# Patient Record
Sex: FEMALE | Race: WHITE | URBAN - METROPOLITAN AREA
[De-identification: names, ages, dates, MRNs, and addresses within clinical notes are randomized per-mention and may not be internally consistent; named-entity substitution may affect disease eponyms.]

---

## 2022-02-04 ENCOUNTER — WALK IN (OUTPATIENT)
Dept: URGENT CARE | Age: 52
End: 2022-02-04

## 2022-02-04 VITALS
OXYGEN SATURATION: 100 % | SYSTOLIC BLOOD PRESSURE: 130 MMHG | RESPIRATION RATE: 18 BRPM | DIASTOLIC BLOOD PRESSURE: 80 MMHG | TEMPERATURE: 98 F | HEART RATE: 90 BPM | WEIGHT: 140 LBS

## 2022-02-04 DIAGNOSIS — S93.402A SPRAIN OF LEFT ANKLE, UNSPECIFIED LIGAMENT, INITIAL ENCOUNTER: Primary | ICD-10-CM

## 2022-02-04 PROCEDURE — 99212 OFFICE O/P EST SF 10 MIN: CPT | Performed by: EMERGENCY MEDICINE

## 2022-02-04 RX ORDER — HYDROCHLOROTHIAZIDE 12.5 MG/1
TABLET ORAL
COMMUNITY

## 2022-02-04 RX ORDER — METHYLPHENIDATE HYDROCHLORIDE EXTENDED RELEASE 20 MG/1
TABLET ORAL
COMMUNITY

## 2022-02-04 RX ORDER — METOPROLOL SUCCINATE 50 MG/1
50 TABLET, EXTENDED RELEASE ORAL DAILY
COMMUNITY
Start: 2022-01-17

## 2022-02-04 RX ORDER — FLUTICASONE PROPIONATE 50 MCG
SPRAY, SUSPENSION (ML) NASAL
COMMUNITY

## 2022-02-04 ASSESSMENT — PAIN SCALES - GENERAL: PAINLEVEL: 2

## 2025-02-11 ENCOUNTER — HOSPITAL ENCOUNTER (EMERGENCY)
Age: 55
Discharge: HOME | End: 2025-02-11
Attending: EMERGENCY MEDICINE

## 2025-02-11 ENCOUNTER — APPOINTMENT (OUTPATIENT)
Dept: GENERAL RADIOLOGY | Age: 55
End: 2025-02-11
Attending: EMERGENCY MEDICINE

## 2025-02-11 VITALS
OXYGEN SATURATION: 95 % | SYSTOLIC BLOOD PRESSURE: 135 MMHG | TEMPERATURE: 99 F | RESPIRATION RATE: 18 BRPM | HEIGHT: 62 IN | HEART RATE: 95 BPM | DIASTOLIC BLOOD PRESSURE: 85 MMHG | BODY MASS INDEX: 25.61 KG/M2

## 2025-02-11 DIAGNOSIS — J20.8 VIRAL BRONCHITIS: ICD-10-CM

## 2025-02-11 DIAGNOSIS — J06.9 VIRAL UPPER RESPIRATORY TRACT INFECTION WITH COUGH: Primary | ICD-10-CM

## 2025-02-11 LAB
ANION GAP SERPL CALC-SCNC: 12 MMOL/L (ref 7–19)
ATRIAL RATE (BPM): 89
BASOPHILS # BLD: 0 K/MCL (ref 0–0.3)
BASOPHILS NFR BLD: 0 %
BUN SERPL-MCNC: 8 MG/DL (ref 6–20)
BUN/CREAT SERPL: 14 (ref 7–25)
CALCIUM SERPL-MCNC: 9.9 MG/DL (ref 8.4–10.2)
CHLORIDE SERPL-SCNC: 102 MMOL/L (ref 97–110)
CO2 SERPL-SCNC: 27 MMOL/L (ref 21–32)
CREAT SERPL-MCNC: 0.58 MG/DL (ref 0.51–0.95)
DEPRECATED RDW RBC: 42.8 FL (ref 39–50)
EGFRCR SERPLBLD CKD-EPI 2021: >90 ML/MIN/{1.73_M2}
EOSINOPHIL # BLD: 0.8 K/MCL (ref 0–0.5)
EOSINOPHIL NFR BLD: 7 %
ERYTHROCYTE [DISTWIDTH] IN BLOOD: 12.4 % (ref 11–15)
FASTING DURATION TIME PATIENT: ABNORMAL H
FLUAV RNA RESP QL NAA+PROBE: NOT DETECTED
FLUBV RNA RESP QL NAA+PROBE: NOT DETECTED
GLUCOSE SERPL-MCNC: 112 MG/DL (ref 70–99)
HCT VFR BLD CALC: 43.4 % (ref 36–46.5)
HGB BLD-MCNC: 14.6 G/DL (ref 12–15.5)
IMM GRANULOCYTES # BLD AUTO: 0.1 K/MCL (ref 0–0.2)
IMM GRANULOCYTES # BLD: 1 %
LYMPHOCYTES # BLD: 1.7 K/MCL (ref 1–4)
LYMPHOCYTES NFR BLD: 15 %
MAGNESIUM SERPL-MCNC: 1.9 MG/DL (ref 1.7–2.4)
MCH RBC QN AUTO: 31.4 PG (ref 26–34)
MCHC RBC AUTO-ENTMCNC: 33.6 G/DL (ref 32–36.5)
MCV RBC AUTO: 93.3 FL (ref 78–100)
MONOCYTES # BLD: 0.6 K/MCL (ref 0.3–0.9)
MONOCYTES NFR BLD: 5 %
NEUTROPHILS # BLD: 8.4 K/MCL (ref 1.8–7.7)
NEUTROPHILS NFR BLD: 72 %
NRBC BLD MANUAL-RTO: 0 /100 WBC
P AXIS (DEGREES): 55
PLATELET # BLD AUTO: 310 K/MCL (ref 140–450)
POTASSIUM SERPL-SCNC: 3.5 MMOL/L (ref 3.4–5.1)
PR-INTERVAL (MSEC): 144
QRS-INTERVAL (MSEC): 84
QT-INTERVAL (MSEC): 354
QTC: 431
R AXIS (DEGREES): 11
RBC # BLD: 4.65 MIL/MCL (ref 4–5.2)
REPORT TEXT: NORMAL
RSV AG NPH QL IA.RAPID: NOT DETECTED
SARS-COV-2 RNA RESP QL NAA+PROBE: NOT DETECTED
SERVICE CMNT-IMP: NORMAL
SERVICE CMNT-IMP: NORMAL
SODIUM SERPL-SCNC: 137 MMOL/L (ref 135–145)
T AXIS (DEGREES): 47
VENTRICULAR RATE EKG/MIN (BPM): 89
WBC # BLD: 11.6 K/MCL (ref 4.2–11)

## 2025-02-11 PROCEDURE — 10002803 HB RX 637: Performed by: EMERGENCY MEDICINE

## 2025-02-11 PROCEDURE — 85025 COMPLETE CBC W/AUTO DIFF WBC: CPT | Performed by: EMERGENCY MEDICINE

## 2025-02-11 PROCEDURE — 94640 AIRWAY INHALATION TREATMENT: CPT

## 2025-02-11 PROCEDURE — 80048 BASIC METABOLIC PNL TOTAL CA: CPT | Performed by: EMERGENCY MEDICINE

## 2025-02-11 PROCEDURE — 83735 ASSAY OF MAGNESIUM: CPT | Performed by: EMERGENCY MEDICINE

## 2025-02-11 PROCEDURE — 10002807 HB RX 258: Performed by: EMERGENCY MEDICINE

## 2025-02-11 PROCEDURE — 10004180 HB COUNTER-TRANSPORT

## 2025-02-11 PROCEDURE — 93005 ELECTROCARDIOGRAM TRACING: CPT | Performed by: STUDENT IN AN ORGANIZED HEALTH CARE EDUCATION/TRAINING PROGRAM

## 2025-02-11 PROCEDURE — 99285 EMERGENCY DEPT VISIT HI MDM: CPT

## 2025-02-11 PROCEDURE — 94664 DEMO&/EVAL PT USE INHALER: CPT

## 2025-02-11 PROCEDURE — 36415 COLL VENOUS BLD VENIPUNCTURE: CPT

## 2025-02-11 PROCEDURE — 71046 X-RAY EXAM CHEST 2 VIEWS: CPT

## 2025-02-11 PROCEDURE — 0241U COVID/FLU/RSV PANEL: CPT | Performed by: EMERGENCY MEDICINE

## 2025-02-11 RX ORDER — HYDROCODONE BITARTRATE AND ACETAMINOPHEN 5; 325 MG/1; MG/1
1 TABLET ORAL ONCE
Status: DISCONTINUED | OUTPATIENT
Start: 2025-02-11 | End: 2025-02-11 | Stop reason: HOSPADM

## 2025-02-11 RX ORDER — IBUPROFEN 600 MG/1
600 TABLET, FILM COATED ORAL EVERY 8 HOURS PRN
Qty: 21 TABLET | Refills: 0 | Status: SHIPPED | OUTPATIENT
Start: 2025-02-11 | End: 2025-02-18

## 2025-02-11 RX ORDER — ALBUTEROL SULFATE 90 UG/1
2 INHALANT RESPIRATORY (INHALATION) EVERY 4 HOURS PRN
Qty: 8.5 G | Refills: 0 | Status: SHIPPED | OUTPATIENT
Start: 2025-02-11

## 2025-02-11 RX ORDER — IPRATROPIUM BROMIDE AND ALBUTEROL SULFATE 2.5; .5 MG/3ML; MG/3ML
3 SOLUTION RESPIRATORY (INHALATION) ONCE
Status: DISCONTINUED | OUTPATIENT
Start: 2025-02-11 | End: 2025-02-11

## 2025-02-11 RX ORDER — ALBUTEROL SULFATE 90 UG/1
2 INHALANT RESPIRATORY (INHALATION) ONCE
Status: COMPLETED | OUTPATIENT
Start: 2025-02-11 | End: 2025-02-11

## 2025-02-11 RX ORDER — ACETAMINOPHEN 325 MG/1
650 TABLET ORAL ONCE
Status: DISCONTINUED | OUTPATIENT
Start: 2025-02-11 | End: 2025-02-11 | Stop reason: HOSPADM

## 2025-02-11 RX ADMIN — ALBUTEROL SULFATE 2 PUFF: 90 AEROSOL, METERED RESPIRATORY (INHALATION) at 13:16

## 2025-02-11 RX ADMIN — SODIUM CHLORIDE, POTASSIUM CHLORIDE, SODIUM LACTATE AND CALCIUM CHLORIDE 1000 ML: 600; 310; 30; 20 INJECTION, SOLUTION INTRAVENOUS at 13:06

## 2025-02-11 ASSESSMENT — PAIN SCALES - GENERAL: PAINLEVEL_OUTOF10: 4

## 2025-06-11 ENCOUNTER — OFFICE VISIT (OUTPATIENT)
Dept: RHEUMATOLOGY | Facility: CLINIC | Age: 55
End: 2025-06-11
Payer: COMMERCIAL

## 2025-06-11 VITALS
RESPIRATION RATE: 16 BRPM | WEIGHT: 173 LBS | BODY MASS INDEX: 30.65 KG/M2 | HEIGHT: 63 IN | OXYGEN SATURATION: 96 % | SYSTOLIC BLOOD PRESSURE: 128 MMHG | HEART RATE: 95 BPM | TEMPERATURE: 97 F | DIASTOLIC BLOOD PRESSURE: 84 MMHG

## 2025-06-11 DIAGNOSIS — I73.00 RAYNAUD'S DISEASE WITHOUT GANGRENE: ICD-10-CM

## 2025-06-11 DIAGNOSIS — R76.8 POSITIVE ANA (ANTINUCLEAR ANTIBODY): Primary | ICD-10-CM

## 2025-06-11 DIAGNOSIS — L65.9 ALOPECIA: ICD-10-CM

## 2025-06-11 DIAGNOSIS — M25.50 ARTHRALGIA, UNSPECIFIED JOINT: ICD-10-CM

## 2025-06-11 DIAGNOSIS — Z51.81 THERAPEUTIC DRUG MONITORING: ICD-10-CM

## 2025-06-11 PROCEDURE — 99204 OFFICE O/P NEW MOD 45 MIN: CPT | Performed by: INTERNAL MEDICINE

## 2025-06-11 RX ORDER — METHYLPHENIDATE 2.2 MG/H
PATCH TRANSDERMAL
COMMUNITY
Start: 2014-06-10 | End: 2025-06-11 | Stop reason: ALTCHOICE

## 2025-06-11 RX ORDER — MELOXICAM 15 MG/1
15 TABLET ORAL DAILY
COMMUNITY
End: 2025-06-11 | Stop reason: ALTCHOICE

## 2025-06-11 RX ORDER — METHYLPHENIDATE HYDROCHLORIDE 5 MG/1
TABLET ORAL
COMMUNITY
End: 2025-06-11 | Stop reason: ALTCHOICE

## 2025-06-11 RX ORDER — FLUTICASONE PROPIONATE 50 MCG
2 SPRAY, SUSPENSION (ML) NASAL DAILY
COMMUNITY
Start: 2012-06-10

## 2025-06-11 RX ORDER — CLOBETASOL PROPIONATE 0.05 G/100ML
SHAMPOO TOPICAL
COMMUNITY
Start: 2024-10-16

## 2025-06-11 RX ORDER — ATORVASTATIN CALCIUM 10 MG/1
10 TABLET, FILM COATED ORAL DAILY
COMMUNITY
Start: 2023-06-10

## 2025-06-11 RX ORDER — MINOCYCLINE HYDROCHLORIDE 100 MG/1
100 CAPSULE ORAL DAILY
COMMUNITY
End: 2025-06-11 | Stop reason: ALTCHOICE

## 2025-06-11 RX ORDER — HYDROCODONE POLISTIREX AND CHLORPHENIRAMINE POLISTIREX 10; 8 MG/5ML; MG/5ML
SUSPENSION, EXTENDED RELEASE ORAL
COMMUNITY
End: 2025-06-11 | Stop reason: ALTCHOICE

## 2025-06-11 RX ORDER — SERTRALINE HYDROCHLORIDE 100 MG/1
150 TABLET, FILM COATED ORAL DAILY
COMMUNITY
Start: 2025-05-20 | End: 2025-06-11 | Stop reason: ALTCHOICE

## 2025-06-11 RX ORDER — SERTRALINE HYDROCHLORIDE 150 MG/1
150 CAPSULE ORAL
COMMUNITY
Start: 2023-06-11

## 2025-06-11 RX ORDER — HYDROCHLOROTHIAZIDE 25 MG/1
25 TABLET ORAL DAILY
COMMUNITY

## 2025-06-11 RX ORDER — ALBUTEROL SULFATE 90 UG/1
INHALANT RESPIRATORY (INHALATION)
COMMUNITY
End: 2025-06-11 | Stop reason: ALTCHOICE

## 2025-06-11 RX ORDER — IBUPROFEN 600 MG/1
600 TABLET, FILM COATED ORAL EVERY 8 HOURS PRN
COMMUNITY
End: 2025-06-11 | Stop reason: ALTCHOICE

## 2025-06-11 RX ORDER — METHYLPHENIDATE HYDROCHLORIDE EXTENDED RELEASE 20 MG/1
20 TABLET ORAL 2 TIMES DAILY
COMMUNITY

## 2025-06-11 RX ORDER — CEFDINIR 300 MG/1
CAPSULE ORAL
COMMUNITY
End: 2025-06-11 | Stop reason: ALTCHOICE

## 2025-06-11 RX ORDER — CLOBETASOL PROPIONATE 0.5 MG/ML
SOLUTION TOPICAL
COMMUNITY

## 2025-06-11 RX ORDER — METOPROLOL SUCCINATE 50 MG/1
50 TABLET, EXTENDED RELEASE ORAL DAILY
COMMUNITY
Start: 2008-06-10

## 2025-06-11 RX ORDER — EFINACONAZOLE 100 MG/ML
SOLUTION TOPICAL
COMMUNITY
End: 2025-06-11 | Stop reason: ALTCHOICE

## 2025-06-11 RX ORDER — NYSTATIN 100000 [USP'U]/G
POWDER TOPICAL
COMMUNITY
End: 2025-06-11 | Stop reason: ALTCHOICE

## 2025-06-11 RX ORDER — ONDANSETRON 4 MG/1
4 TABLET, ORALLY DISINTEGRATING ORAL EVERY 8 HOURS PRN
COMMUNITY
Start: 2025-02-10 | End: 2025-06-11 | Stop reason: ALTCHOICE

## 2025-06-11 RX ORDER — HYDROCHLOROTHIAZIDE 12.5 MG/1
12.5 TABLET ORAL DAILY
COMMUNITY
Start: 2022-06-10 | End: 2025-06-11 | Stop reason: ALTCHOICE

## 2025-06-11 NOTE — PROGRESS NOTES
Rheumatology New Patient Note  =====================================================================================================    Date of visit: 6/11/2025  ?  Chief complaint: +AGUSTÍN  Chief Complaint   Patient presents with    New Patient     New patient referred by Hernando Villalobos for positive AGUSTÍN.   Rapid 3 score is 3.7  Patient states she does have some joint pain. She has a lot of issues with her scalp. She gets pimples or pustules on her scalp and states it feels like \" thick, dry skin. \" She has patches on her scalp where the hair no longer grows. She is using a solution and prescription shampoo with some relief. There is a lot of itching on her scalp and forehead as well.      Referring (will send letter)  Dr. Hernando Villalobos    PCP  ILDAVIVIENNE MICHELLE  Fax: [phone]  Phone: [phone]  =====================================================================================================  HPI    Toyin Guzman is a 54 year old female     Toyin Guzman is a 54 year old female who presents with scalp issues and hair thinning. She was referred by her dermatologist for evaluation of scalp issues and hair thinning in the context of a positive AGUSTÍN.    She has been experiencing scalp issues since 2023.  Per patient report, diagnosed with some sort of infectious pustules.  Some improvement after a 90-day course of minocycline and clobetasol shampoo, however her alopecia persists.  Hair thinning is noted on the sides and top of her scalp, with visible patches where no hair is growing. Occasional itchiness is present.    She has a history of narcolepsy and cataplexy, diagnosed approximately 12 years ago, associated with sleep disturbances and excessive daytime sleepiness. She experiences 'brain fog' and short-term memory loss, often forgetting names and details. Her cataplexy symptoms have improved since her sertraline dose was increased to 150 mg.    She experiences joint and muscle pain,  particularly in her ankles, knees, hips, and calves. Morning stiffness in her ankles requires time to stretch before descending stairs. After driving, she experiences tightness and pain in her knees, ankles, and hips, necessitating stretching before walking. Her calves are often tight and sensitive to touch, with specific areas feeling like 'rubbing needles across my skin.'    She has a history of Raynaud's phenomenon, with her fingers turning white in cold conditions, and experiences loss of sensation in her feet when standing on cold surfaces.  - Raynaud's since her late 30s.  - Mild blotchiness/erythema of the skin with sun exposure.    She smokes approximately ten cigarettes a day and has attempted to quit multiple times. She is aware of the stimulant effect of smoking on her narcolepsy symptoms.    She experiences clogged tear ducts, which respond to allergy medication.    Denies current malar rash, photosensitivity rash, discoid lesions, oral/nasal ulcers, pleuritic chest pain, arthritis, seizures/psychosis, dry eyes/mouth, or blood clots.    Denies miscarriages or obstetric events (early pre-eclampsia, IUGR, placental insufficiency)  -Prior pregnancies and/or children:  --Pregnancy complications    14 point ROS negative except noted above    Medications:  Current Medications[1]    Past Medical History:  Past Medical History[2]  Past Surgical History:  Past Surgical History[3]  Family History:  Family History[4]  Social History:  Short Social Hx on File[5]  ?  Allergies:  Allergies[6]      Objective    Vitals:    06/11/25 1110   BP: 128/84   Pulse: 95   Resp: 16   Temp: 97.3 °F (36.3 °C)   SpO2: 96%   Weight: 173 lb (78.5 kg)   Height: 5' 3\" (1.6 m)     GEN: NAD, well-nourished.   HEENT: Head: NCAT. Face: No lesions. Eyes: Conjunctiva clear. Sclera are anicteric. PERRLA. EOMs are full. Ears: The right and left ear canals are clear.  Nose: No external or internal nasal deformities. Nasal septum is midline.  Mouth: The lips are within normal limits.  No oral ulcers Tongue is midline with no lesions. The oral cavity is clear.   Neck: Supple. No neck masses. No thyromegaly. No LAD, parotid or submandicular gland palpated.   CV: RRR, no mrg, S1/S2  PULM: CTAB, no wrr, easy effort  Extremities: No cyanosis, edema or deformities.   Neurologic: Strength, CN2-12 grossly intact   Psych: normal affect.   Skin: Scattered areas of erythematous scalp with fairly circular areas of alopecia especially overlying the superior aspect of scalp.  Temporal thinning of the hair noted as well.  MSK: 28 joint count performed. No evidence of synovitis in mcp, pip, dip, wrist, elbows, shoulders, hips, knees, ankles, mtp unless otherwise noted. Full ROM of elbows, wrists, knees.     No synovitis noted  - Tender to palpation in the medial shins    Labs:  11/2024  dsDNA 11, rest of SYMONE neg    3/2025  Creat 0.75, rest of cmp wnl    No results found for: \"WBC\", \"RBC\", \"HGB\", \"HCT\", \"PLT\", \"MPV\", \"MCV\", \"MCH\", \"MCHC\", \"RDW\", \"NEPRELIM\", \"NEUTABS\", \"LYMPHABS\", \"EOSABS\", \"BASABS\", \"NEUT\", \"LYMPH\", \"MON\", \"EOS\", \"BASO\", \"NEPERCENT\", \"LYPERCENT\", \"MOPERCENT\", \"EOPERCENT\", \"BAPERCENT\", \"NE\", \"LYMABS\", \"MOABSO\", \"EOABSO\", \"BAABSO\"  No results found for: \"GLU\", \"BUN\", \"BUNCREA\", \"CREATSERUM\", \"ANIONGAP\", \"GFR\", \"GFRNAA\", \"GFRAA\", \"CA\", \"OSMOCALC\", \"ALKPHO\", \"AST\", \"ALT\", \"ALKPHOS\", \"BILT\", \"TP\", \"ALB\", \"GLOBULIN\", \"AGRATIO\", \"NA\", \"K\", \"CL\", \"CO2\"      No results found for: \"ANATI\", \"AGUSTÍN\", \"ANAS\", \"ANASCRN\", \"ANASCRNRFLX\", \"JIGNESH\"  No results found for: \"SSA\", \"SSAUR\", \"ANTISSA\", \"SSA52\", \"SSA60\", \"SSADD\", \"SSB\", \"ANTISSB\"  No results found for: \"DSDNA\", \"ANTIDSDNA\", \"SMUD\", \"ANTISM\", \"SM\", \"RNP\", \"ANTIRNP\", \"SMITHRNP\"  No results found for: \"SCL70\", \"SCL\", \"KFYQMZT71\"  No results found for: \"C3\", \"C4\"  No results found for: \"DRVVT\", \"LAINT\", \"PTTLUPUS\", \"LUPUSINTERP\", \"LA\", \"R2AJ5ELKLO\", \"T0LZ5AVVSZ\", \"Q9MJPZRHRV\", \"A5JTSHFVOV\"  No results found for:  \"CARDIOLIPIGG\", \"CARDIOLIPIGM\", \"CARDIOLIPIGA\", \"CARDIOIGA\", \"CARLIP\"    Additional Labs:    Radiology:    Radiology review:      =====================================================================================================  Assessment and Plan  Assessment:  1. Positive AGUSTÍN (antinuclear antibody)    2. Raynaud's disease without gangrene    3. Alopecia    4. Arthralgia, unspecified joint    5. Therapeutic drug monitoring      #Positive AGUSTÍN test  # Alopecia  -Clinical manifestations: Raynaud's (since late 30s), alopecia (superior aspect/temporal region)  - Serologies: Low titer dsDNA  - Given the low titer dsDNA, this could be a false positive but given the symptoms I cannot rule out a fairly mild, early UCTD versus SLE.  - partial improvement alopecia with antibiotics and clobetasol shampoo. Minocycline was started, but before the AGUSTÍN AGUSTÍN was drawn.  --[facility] ANAlyzer panel that includes: AGUSTÍN Screen,IFA, DNA (ds) Antibody, Crithidia IFA with Reflex to Titer, Chromatin, Sm, Sm/RNP Antibody RNP Antibody, Sjogren's Antibodies (SS-A, SS-B), Scleroderma Antibody (Scl-70), Harmony-1 Antibody, Centromere B Antibody, Complement Component C3c and C4c, Cardiolipin Antibodies (IgA, IgG, IgM), Beta-2-Glycoprotein I Antibodies (IgG, IgA, IgM), Rheumatoid Factor (IgA, IgG, IgM), Cyclic Citrullinated Peptide (CCP) Antibody (IgG), 14.3.3 eta Protein, Thyroid Peroxidase Antibodies (TPO)  - Lupus anticoagulant, U/A, UPCR  - SPEP/CECILIA, CK  - Additional alopecia labs including TSH, B12/folate, iron studies, TSH  - Consider hydroxychloroquine trial in the future if dsDNA continues to be positive  - Consider scalp biopsy if refractory disease    #Raynaud's phenomenon  -Fairly mild    #Narcolepsy with cataplexy  Long-standing narcolepsy with cataplexy. Sertraline 150 mg has reduced cataplexy symptoms.    #TMJ disorder  Chronic TMJ disorder with teeth grinding. Symptoms improved with increased sertraline dosage.    #Tobacco use  disorder  Chronic tobacco use, approximately 10 cigarettes per day. Aware of health risks and attempting to reduce usage. Discussed nicotine's stimulant effect on narcolepsy.  - Offered resources for smoking cessation if interested.    ?  Plan:  Diagnoses and all orders for this visit:    Positive AGUSTÍN (antinuclear antibody)  -     ANAlyzeR AGUSTÍN, IFA with Reflex Titer/Pattern, Systemic Autoimmune Panel 1 [[id number]] [Q]  -     LUPUS ANTICOAGULANT REFLEX  -     TSH W Reflex To Free T4  -     Iron And Tibc  -     Ferritin  -     B12 AND FOLATE  -     CK (Creatine Kinase) (Not Creatinine)  -     Vitamin D, 25-Hydroxy  -     Urinalysis with Culture Reflex  -     Thyroid peroxidase & thyroglobulin ab  -     Creatinine, Urine, Random  -     Protein,Total,Urine, Random  -     Protein Electrophoresis, with Total Protein and Reflex to CECILIA, Serum [[id number]] [Q]    Raynaud's disease without gangrene  -     ANAlyzeR AGUSTÍN, IFA with Reflex Titer/Pattern, Systemic Autoimmune Panel 1 [[id number]] [Q]    Alopecia  -     ANAlyzeR AGUSTÍN, IFA with Reflex Titer/Pattern, Systemic Autoimmune Panel 1 [[id number]] [Q]  -     LUPUS ANTICOAGULANT REFLEX  -     TSH W Reflex To Free T4  -     Iron And Tibc  -     Ferritin  -     B12 AND FOLATE  -     CK (Creatine Kinase) (Not Creatinine)  -     Vitamin D, 25-Hydroxy  -     Urinalysis with Culture Reflex  -     Thyroid peroxidase & thyroglobulin ab  -     Creatinine, Urine, Random  -     Protein,Total,Urine, Random  -     Protein Electrophoresis, with Total Protein and Reflex to CECILIA, Serum [[id number]] [Q]  -     Uric Acid    Arthralgia, unspecified joint  -     ANAlyzeR AGUSTÍN, IFA with Reflex Titer/Pattern, Systemic Autoimmune Panel 1 [[id number]] [Q]  -     LUPUS ANTICOAGULANT REFLEX  -     TSH W Reflex To Free T4  -     Iron And Tibc  -     Ferritin  -     B12 AND FOLATE  -     CK (Creatine Kinase) (Not Creatinine)  -     Vitamin D, 25-Hydroxy  -     Urinalysis with Culture Reflex  -     Thyroid peroxidase & thyroglobulin  ab  -     Creatinine, Urine, Random  -     Protein,Total,Urine, Random  -     Protein Electrophoresis, with Total Protein and Reflex to CECILIA, Serum [[id number]] [Q]  -     Uric Acid    Therapeutic drug monitoring  -     ANAlyzeR AGUSTÍN, IFA with Reflex Titer/Pattern, Systemic Autoimmune Panel 1 [[id number]] [Q]  -     LUPUS ANTICOAGULANT REFLEX  -     TSH W Reflex To Free T4  -     Iron And Tibc  -     Ferritin  -     B12 AND FOLATE  -     CK (Creatine Kinase) (Not Creatinine)  -     Vitamin D, 25-Hydroxy  -     Urinalysis with Culture Reflex  -     Thyroid peroxidase & thyroglobulin ab  -     Creatinine, Urine, Random  -     Protein,Total,Urine, Random  -     Protein Electrophoresis, with Total Protein and Reflex to CECILIA, Serum [[id number]] [Q]  -     Uric Acid      No follow-ups on file.      The above plan of care, diagnosis, orders, and follow-up were discussed with the patient. Questions related to this recommended plan of care were answered.    Thank you for referring this delightful patient to me. Please feel free to contact me with any questions.     This report was performed utilizing speech recognition software technology. Despite proofreading, speech recognition errors could escape detection. If a word or phrase is confusing or out of context, please do not hesitate to call for   clarification.       Kind regards      Ike Lee MD  [facility]         [1]    clobetasol 0.05 % External Solution       Clobetasol Propionate 0.05 % External Shampoo       fluticasone propionate 50 MCG/ACT Nasal Suspension 2 sprays by Nasal route daily.      hydroCHLOROthiazide 25 MG Oral Tab Take 1 tablet (25 mg total) by mouth daily.      Methylphenidate HCl ER 20 MG Oral Tab CR Take 1 tablet (20 mg total) by mouth 2 (two) times daily.      metoprolol succinate ER 50 MG Oral Tablet 24 Hr Take 1 tablet (50 mg total) by mouth daily.      Sertraline HCl 150 MG Oral Cap 150 mg.      atorvastatin 10 MG Oral Tab Take 1 tablet (10 mg total) by  mouth daily.      Cholecalciferol 50 MCG (2000 UT) Oral Cap Take 1 capsule twice a day by oral route.     [2]   Past Medical History:   Essential hypertension    Hyperlipidemia    Narcolepsy and cataplexy (HCC)   [3] History reviewed. No pertinent surgical history.  [4] History reviewed. No pertinent family history.  [5]   Social History  Socioeconomic History    Marital status: Single   Tobacco Use    Smoking status: Every Day     Current packs/day: 0.50     Types: Cigarettes    Smokeless tobacco: Never   Vaping Use    Vaping status: Never Used   Substance and Sexual Activity    Alcohol use: Not Currently    Drug use: Never   [6]   Allergies  Allergen Reactions    Penicillins HIVES and RASH    Sulfa Antibiotics OTHER (SEE COMMENTS)

## 2025-06-11 NOTE — H&P
The following individual(s) verbally consented to be recorded using ambient AI listening technology and understand that they can each withdraw their consent to this listening technology at any point by asking the clinician to turn off or pause the recording:    Patient name: Toyin Guzman  Additional names:

## 2025-06-16 LAB
APPEARANCE: CLEAR
BILIRUBIN: NEGATIVE
COLOR: YELLOW
CREATININE, RANDOM URINE: 30 MG/DL (ref 20–275)
GLUCOSE: NEGATIVE
KETONES: NEGATIVE
NITRITE: NEGATIVE
OCCULT BLOOD: NEGATIVE
PH: 8 (ref 5–8)
PROTEIN, TOTAL, RANDOM UR: 4 MG/DL (ref 5–24)
PROTEIN: NEGATIVE
SPECIFIC GRAVITY: 1.01 (ref 1–1.03)

## 2025-06-18 ENCOUNTER — TELEPHONE (OUTPATIENT)
Facility: CLINIC | Age: 55
End: 2025-06-18

## 2025-06-18 DIAGNOSIS — R79.0 LOW FERRITIN: Primary | ICD-10-CM

## 2025-06-18 LAB
% SATURATION: 21 % (CALC) (ref 16–45)
ALBUMIN: 4.7 G/DL (ref 3.8–4.8)
ALPHA-1-GLOBULINS: 0.4 G/DL (ref 0.2–0.3)
ALPHA-2-GLOBULINS: 0.9 G/DL (ref 0.5–0.9)
BETA 1 GLOBULIN: 0.5 G/DL (ref 0.4–0.6)
BETA 2 GLOBULIN: 0.4 G/DL (ref 0.2–0.5)
CREATINE KINASE, TOTAL: 87 U/L (ref 21–240)
FERRITIN: 31 NG/ML (ref 16–232)
GAMMA GLOBULINS: 0.8 G/DL (ref 0.8–1.7)
IRON BINDING CAPACITY: 384 MCG/DL (CALC) (ref 250–450)
IRON, TOTAL: 81 MCG/DL (ref 45–160)
PROTEIN, TOTAL: 7.7 G/DL (ref 6.1–8.1)
THYROGLOBULIN ANTIBODIES: <1 IU/ML
THYROID PEROXIDASE$ANTIBODIES: <1 IU/ML
TSH W/REFLEX TO FT4: 3.29 MIU/L
URIC ACID: 5.1 MG/DL (ref 2.5–7)
VITAMIN D, 25-OH, TOTAL: 97 NG/ML (ref 30–100)

## 2025-06-18 RX ORDER — FERROUS SULFATE 325(65) MG
325 TABLET ORAL
Qty: 90 TABLET | Refills: 3 | Status: SHIPPED | OUTPATIENT
Start: 2025-06-18

## 2025-06-19 LAB
ANA SCREEN, IFA: NEGATIVE
B2 GLYCOPROTEIN I (IGA)AB: <2 U/ML
B2 GLYCOPROTEIN I (IGG)AB: <2 U/ML
B2 GLYCOPROTEIN I(IGM)AB: <2 U/ML
CARDIOLIPIN AB (IGA): <2 APL-U/ML
CARDIOLIPIN AB (IGG): <2 GPL-U/ML
CARDIOLIPIN AB (IGM): <2 MPL-U/ML
COMPLEMENT COMPONENT C3C: 171 MG/DL (ref 83–193)
COMPLEMENT COMPONENT C4C: 19 MG/DL (ref 15–57)
CYCLIC CITRULLINATED$PEPTIDE (CCP) AB (IGG): <16 UNITS
DNA AB (DS) CRITHIDIA,IFA: NEGATIVE
DRVVT SCREEN: 27 SEC
FOLATE, SERUM: 10.4 NG/ML
MUTATED CITRULLINATED VIMENTIN (MCV) AB: <20 U/ML
PTT-LA SCREEN: 34 SEC
RHEUMATOID FACTOR (IGA): <5 U
RHEUMATOID FACTOR (IGG): <5 U
RHEUMATOID FACTOR (IGM): <5 U
THYROID PEROXIDASE$ANTIBODIES: <1 IU/ML
VITAMIN B12: 294 PG/ML (ref 200–1100)